# Patient Record
Sex: MALE | Race: WHITE | URBAN - NONMETROPOLITAN AREA
[De-identification: names, ages, dates, MRNs, and addresses within clinical notes are randomized per-mention and may not be internally consistent; named-entity substitution may affect disease eponyms.]

---

## 2018-07-13 ENCOUNTER — OFFICE VISIT (OUTPATIENT)
Dept: FAMILY MEDICINE | Facility: CLINIC | Age: 33
End: 2018-07-13
Payer: COMMERCIAL

## 2018-07-13 VITALS
BODY MASS INDEX: 20.41 KG/M2 | TEMPERATURE: 98.2 F | WEIGHT: 154 LBS | SYSTOLIC BLOOD PRESSURE: 124 MMHG | HEART RATE: 68 BPM | DIASTOLIC BLOOD PRESSURE: 82 MMHG | RESPIRATION RATE: 18 BRPM | HEIGHT: 73 IN

## 2018-07-13 DIAGNOSIS — S16.1XXA STRAIN OF NECK MUSCLE, INITIAL ENCOUNTER: Primary | ICD-10-CM

## 2018-07-13 PROCEDURE — 99203 OFFICE O/P NEW LOW 30 MIN: CPT | Performed by: NURSE PRACTITIONER

## 2018-07-13 RX ORDER — IBUPROFEN 800 MG/1
800 TABLET, FILM COATED ORAL EVERY 8 HOURS PRN
Qty: 60 TABLET | Refills: 0 | Status: SHIPPED | OUTPATIENT
Start: 2018-07-13 | End: 2018-08-14

## 2018-07-13 RX ORDER — METHOCARBAMOL 500 MG/1
500-1000 TABLET, FILM COATED ORAL 3 TIMES DAILY
Qty: 60 TABLET | Refills: 0 | Status: SHIPPED | OUTPATIENT
Start: 2018-07-13 | End: 2018-08-14

## 2018-07-13 RX ORDER — METHOCARBAMOL 750 MG/1
750 TABLET, FILM COATED ORAL 4 TIMES DAILY PRN
Qty: 30 TABLET | Refills: 0 | Status: SHIPPED | OUTPATIENT
Start: 2018-07-13 | End: 2018-07-13

## 2018-07-13 NOTE — NURSING NOTE
"Chief Complaint   Patient presents with     Neck Pain       Initial /82 (Cuff Size: Adult Large)  Pulse 68  Temp 98.2  F (36.8  C) (Tympanic)  Resp 18  Ht 6' 1\" (1.854 m)  Wt 154 lb (69.9 kg)  BMI 20.32 kg/m2 Estimated body mass index is 20.32 kg/(m^2) as calculated from the following:    Height as of this encounter: 6' 1\" (1.854 m).    Weight as of this encounter: 154 lb (69.9 kg).      Health Maintenance that is potentially due pending provider review:  NONE    n/a    Is there anyone who you would like to be able to receive your results? Not Applicable  If yes have patient fill out ZAIDA    "

## 2018-07-13 NOTE — LETTER
July 13, 2018      Bradley Cruz  [address] [facility] [address]        To Whom It May Concern:    Bradley Cruz  was seen today for neck muscle strain.  Please excuse him  until Monday July 16, 2018 due to injury.        Sincerely,        Love Llanos, CNP

## 2018-07-13 NOTE — MR AVS SNAPSHOT
After Visit Summary   7/13/2018    Bradley Cruz    MRN: [id number]           Patient Information     Date Of Birth          1985        Visit Information        Provider Department      7/13/2018 2:40 PM Love Llanos, CNP [facility]        Today's Diagnoses     Strain of neck muscle, initial encounter    -  1      Care Instructions    1. Strain of neck muscle, initial encounter  ACUTE  Robaxin muscle relaxer prescribed- take as directed. Do not drive or use heavy machinery with use        Understanding Cervical Strain    There are 7 bones (vertebrae) in the neck that are part of the spine. These are called the cervical spine. Cervical strain is a medical term for neck pain. The neck has several layers of muscles. These are connected with tendons to the cervical spine and other bones. Neck pain is often the result of injury to these muscles and tendons.  Causes of cervical strain  Different types of stress on the neck can damage muscles and tendons (soft tissues) and cause cervical strain. Cervical tissues can be damaged by:    The neck being forced past its normal range of motion, such as in a car accident or sports injury    Constant, low-level stress, such as from poor posture or a poorly set-up workspace  Symptoms of cervical strain  These may include:    Neck pain or stiffness    Pain in the shoulders or upper back    Muscle spasms    Headache, often starting at the base of the neck    Irritability, difficulty concentrating, or sleeplessness  Treatment for cervical strain  This problem often gets better on its own. Treatments aim to reduce pain and inflammation and increase the range of motion of the neck. Possible treatments include:    Over-the-counter or prescription pain medicine. These help relieve pain and inflammation.    Stretching exercises to decrease neck stiffness.    Massage to decrease neck stiffness.    Cold or heat pack. These help reduce pain and  swelling.  Call 911  Call 911 right away if you have any of these:    Face drooping or numbness    Numbness or weakness, especially in the arms or on one side    Slurred speech or difficulty speaking    Blurred vision   When to call your healthcare provider  Call your healthcare provider right away if you have any of these:    Fever of 100.4 F (38 C) or higher, or as directed    Pain or stiffness that gets worse    Symptoms that don t get better, or get worse    Numbness, tingling, weakness or shooting pains into the arms or legs    New symptoms  Date Last Reviewed: 3/10/2016    3961-9637 [employer]. [address] [place] [address]. All rights reserved. This information is not intended as a substitute for professional medical care. Always follow your healthcare professional's instructions.        Neck Clock (Flexibility)    1. Lie on your back on the floor, with your knees bent and your feet flat on the floor. Place a rolled-up towel or neck roll under your neck.  2. Close your eyes and imagine a clock face. With your nose, slowly trace the outer edge of the clock in a clockwise direction. Move your neck smoothly. Don t force your head or neck.  3. Repeat 5 times, or as instructed.  4. Then switch to a counterclockwise direction, and repeat the exercise 5 times, or as instructed.     Challenge yourself  You can also do this exercise while sitting at your work desk. Sit up straight with your back supported firmly against your chair. You can do the exercise several times throughout your day.   Date Last Reviewed: 3/10/2016    2949-8102 [employer]. [address] [place] [address]. All rights reserved. This information is not intended as a substitute for professional medical care. Always follow your healthcare professional's instructions.                Follow-ups after your visit        Who to contact     If you have questions or need follow up information about today's  "clinic visit or your schedule please contact [facility] directly at [phone].  Normal or non-critical lab and imaging results will be communicated to you by MyChart, letter or phone within 4 business days after the clinic has received the results. If you do not hear from us within 7 days, please contact the clinic through MyChart or phone. If you have a critical or abnormal lab result, we will notify you by phone as soon as possible.  Submit refill requests through [employer] or call your pharmacy and they will forward the refill request to us. Please allow 3 business days for your refill to be completed.          Additional Information About Your Visit        Care EveryWhere ID     This is your Care EveryWhere ID. This could be used by other organizations to access your [facility] medical records  [id number]        Your Vitals Were     Pulse Temperature Respirations Height BMI (Body Mass Index)       68 98.2  F (36.8  C) (Tympanic) 18 6' 1\" (1.854 m) 20.32 kg/m2        Blood Pressure from Last 3 Encounters:   07/13/18 124/82    Weight from Last 3 Encounters:   07/13/18 154 lb (69.9 kg)              Today, you had the following     No orders found for display         Today's Medication Changes          These changes are accurate as of 7/13/18  3:10 PM.  If you have any questions, ask your nurse or doctor.               Start taking these medicines.        Dose/Directions    ibuprofen 800 MG tablet   Commonly known as:  ADVIL/MOTRIN   Used for:  Strain of neck muscle, initial encounter   Started by:  Love Llanos CNP        Dose:  800 mg   Take 1 tablet (800 mg) by mouth every 8 hours as needed for moderate pain   Quantity:  60 tablet   Refills:  0       methocarbamol 750 MG tablet   Commonly known as:  ROBAXIN   Used for:  Strain of neck muscle, initial encounter   Started by:  Love Llanos CNP        Dose:  750 mg   Take 1 tablet (750 mg) by mouth 4 times daily as needed for " muscle spasms   Quantity:  30 tablet   Refills:  0            Where to get your medicines      These medications [id number]  [address] , [facility] [address]     Phone:  [phone]     ibuprofen 800 MG tablet    methocarbamol 750 MG tablet                Primary Care Provider Fax #    Physician No Ref-Primary [phone]       No address on file        Equal Access to Services     ENA POOLE : Hadii aad ku hadasho Soomaali, waaxda luqadaha, qaybta kaalmada adeegyada, waxay idiin haysupriyan adeeg khchasesh ladamiantimi ah. So [facility] [phone].    ATENCIÓN: Si habla deborah, tiene a martínez disposición servicios gratuitos de asistencia lingüística. Manjinderame al [phone].    We comply with applicable federal civil rights laws and [place] laws. We do not discriminate on the basis of race, color, national origin, age, disability, sex, sexual orientation, or gender identity.            Thank you!     Thank you for choosing [facility]  for your care. Our goal is always to provide you with excellent care. Hearing back from our patients is one way we can continue to improve our services. Please take a few minutes to complete the written survey that you may receive in the mail after your visit with us. Thank you!             Your Updated Medication List - Protect others around you: Learn how to safely use, store and throw away your medicines at www.disposemymeds.org.          This list is accurate as of 7/13/18  3:10 PM.  Always use your most recent med list.                   Brand Name Dispense Instructions for use Diagnosis    ibuprofen 800 MG tablet    ADVIL/MOTRIN    60 tablet    Take 1 tablet (800 mg) by mouth every 8 hours as needed for moderate pain    Strain of neck muscle, initial encounter       methocarbamol 750 MG tablet    ROBAXIN    30 tablet    Take 1 tablet (750 mg) by mouth 4 times daily as needed for muscle spasms    Strain of neck muscle,  initial encounter

## 2018-07-13 NOTE — PATIENT INSTRUCTIONS
1. Strain of neck muscle, initial encounter  ACUTE  Robaxin muscle relaxer prescribed- take as directed. Do not drive or use heavy machinery with use        Understanding Cervical Strain    There are 7 bones (vertebrae) in the neck that are part of the spine. These are called the cervical spine. Cervical strain is a medical term for neck pain. The neck has several layers of muscles. These are connected with tendons to the cervical spine and other bones. Neck pain is often the result of injury to these muscles and tendons.  Causes of cervical strain  Different types of stress on the neck can damage muscles and tendons (soft tissues) and cause cervical strain. Cervical tissues can be damaged by:    The neck being forced past its normal range of motion, such as in a car accident or sports injury    Constant, low-level stress, such as from poor posture or a poorly set-up workspace  Symptoms of cervical strain  These may include:    Neck pain or stiffness    Pain in the shoulders or upper back    Muscle spasms    Headache, often starting at the base of the neck    Irritability, difficulty concentrating, or sleeplessness  Treatment for cervical strain  This problem often gets better on its own. Treatments aim to reduce pain and inflammation and increase the range of motion of the neck. Possible treatments include:    Over-the-counter or prescription pain medicine. These help relieve pain and inflammation.    Stretching exercises to decrease neck stiffness.    Massage to decrease neck stiffness.    Cold or heat pack. These help reduce pain and swelling.  Call 911  Call 911 right away if you have any of these:    Face drooping or numbness    Numbness or weakness, especially in the arms or on one side    Slurred speech or difficulty speaking    Blurred vision   When to call your healthcare provider  Call your healthcare provider right away if you have any of these:    Fever of 100.4 F (38 C) or higher, or as directed    Pain  or stiffness that gets worse    Symptoms that don t get better, or get worse    Numbness, tingling, weakness or shooting pains into the arms or legs    New symptoms  Date Last Reviewed: 3/10/2016    [id number] [employer]. [address] [place] [address]. All rights reserved. This information is not intended as a substitute for professional medical care. Always follow your healthcare professional's instructions.        Neck Clock (Flexibility)    1. Lie on your back on the floor, with your knees bent and your feet flat on the floor. Place a rolled-up towel or neck roll under your neck.  2. Close your eyes and imagine a clock face. With your nose, slowly trace the outer edge of the clock in a clockwise direction. Move your neck smoothly. Don t force your head or neck.  3. Repeat 5 times, or as instructed.  4. Then switch to a counterclockwise direction, and repeat the exercise 5 times, or as instructed.     Challenge yourself  You can also do this exercise while sitting at your work desk. Sit up straight with your back supported firmly against your chair. You can do the exercise several times throughout your day.   Date Last Reviewed: 3/10/2016    [id number] [employer]. [address] [place] [address]. All rights reserved. This information is not intended as a substitute for professional medical care. Always follow your healthcare professional's instructions.

## 2018-07-13 NOTE — PROGRESS NOTES
"  SUBJECTIVE:   Bradley Cruz is a 33 year old male who presents to clinic today for the following health issues:      Neck Pain      Duration: Yesterday     Description:  Location: Neck - left side   Radiation: into the left neck and into the left Upper shoulder    Intensity:  moderate    Accompanying signs and symptoms: Can't turn his head to the left    History (similar episodes/previous evaluation): None    Precipitating or alleviating factors: None    Therapies tried and outcome: tylenol, heat, ice   Work-[employer] as [employer]  Woke up with neck pain  No injury  He's been off work for a week- no \"crazy activity\"    HPI:   PCP:  Physician No Ref-Primary None    There is no problem list on file for this patient.    Current Outpatient Prescriptions   Medication     ibuprofen (ADVIL/MOTRIN) 800 MG tablet     methocarbamol (ROBAXIN) 750 MG tablet     No current facility-administered medications for this visit.        Health Maintenance Due   Topic Date Due     PHQ-2 Q1 YR  03/14/1998     TETANUS IMMUNIZATION (SYSTEM ASSIGNED)  03/14/2004     HIV SCREEN (SYSTEM ASSIGNED)  03/14/2004       Reviewed and updated:  Tobacco  Allergies  Meds  Med Hx  Surg Hx  Fam Hx  Soc Hx     ROS:  Constitutional, HEENT, cardiovascular, pulmonary, gi and gu systems are negative, except as otherwise noted.    PHYSICAL EXAM:   /82 (Cuff Size: Adult Large)  Pulse 68  Temp 98.2  F (36.8  C) (Tympanic)  Resp 18  Ht 6' 1\" (1.854 m)  Wt 154 lb (69.9 kg)  BMI 20.32 kg/m2  Body mass index is 20.32 kg/(m^2).  GENERAL APPEARANCE: healthy, alert and no distress  NECK: no adenopathy, no asymmetry, masses, or scars and thyroid normal to palpation  RESP: lungs clear to auscultation - no rales, rhonchi or wheezes  CV: regular rates and rhythm, normal S1 S2, no S3 or S4 and no murmur, click or rub  MS: extremities normal- no gross deformities noted  ORTHO: Cervical Spine Exam: Inspection: normal cervical lordosis  Tender:  left " paracervical muscles  Non-tender:  remainder  Range of Motion:  flexion:  weakness, painful, extension: weakness, painful, left lateral bending: unable, painful, right lateral bending: decreased, painful, left lateral rotation:  decreased, painful, right lateral rotation:  decreased, painful  Strength: Full strength of all neck muscles, painful against left lateral bending  Special tests:  None d/t pain    PSYCH: mentation appears normal and affect normal/bright    ASSESSMENT & PLAN:     1. Strain of neck muscle, initial encounter  ACUTE  Robaxin muscle relaxer prescribed- take as directed. Do not drive or use heavy machinery with use  Ibuprofen prescribed- may use in harmony with Tylenol  Reviewed home care  Note for work- 2 days off  - ibuprofen (ADVIL/MOTRIN) 800 MG tablet; Take 1 tablet (800 mg) by mouth every 8 hours as needed for moderate pain  Dispense: 60 tablet; Refill: 0  - methocarbamol (ROBAXIN) 500 MG tablet; Take 1-2 tablets (500-1,000 mg) by mouth 3 times daily  Dispense: 60 tablet; Refill: 0      Understanding Cervical Strain    There are 7 bones (vertebrae) in the neck that are part of the spine. These are called the cervical spine. Cervical strain is a medical term for neck pain. The neck has several layers of muscles. These are connected with tendons to the cervical spine and other bones. Neck pain is often the result of injury to these muscles and tendons.  Causes of cervical strain  Different types of stress on the neck can damage muscles and tendons (soft tissues) and cause cervical strain. Cervical tissues can be damaged by:    The neck being forced past its normal range of motion, such as in a car accident or sports injury    Constant, low-level stress, such as from poor posture or a poorly set-up workspace  Symptoms of cervical strain  These may include:    Neck pain or stiffness    Pain in the shoulders or upper back    Muscle spasms    Headache, often starting at the base of the  neck    Irritability, difficulty concentrating, or sleeplessness  Treatment for cervical strain  This problem often gets better on its own. Treatments aim to reduce pain and inflammation and increase the range of motion of the neck. Possible treatments include:    Over-the-counter or prescription pain medicine. These help relieve pain and inflammation.    Stretching exercises to decrease neck stiffness.    Massage to decrease neck stiffness.    Cold or heat pack. These help reduce pain and swelling.  Call 911  Call 911 right away if you have any of these:    Face drooping or numbness    Numbness or weakness, especially in the arms or on one side    Slurred speech or difficulty speaking    Blurred vision   When to call your healthcare provider  Call your healthcare provider right away if you have any of these:    Fever of 100.4 F (38 C) or higher, or as directed    Pain or stiffness that gets worse    Symptoms that don t get better, or get worse    Numbness, tingling, weakness or shooting pains into the arms or legs    New symptoms  Date Last Reviewed: 3/10/2016    9795-1911 [employer]. [address] [place] [address]. All rights reserved. This information is not intended as a substitute for professional medical care. Always follow your healthcare professional's instructions.        Neck Clock (Flexibility)    1. Lie on your back on the floor, with your knees bent and your feet flat on the floor. Place a rolled-up towel or neck roll under your neck.  2. Close your eyes and imagine a clock face. With your nose, slowly trace the outer edge of the clock in a clockwise direction. Move your neck smoothly. Don t force your head or neck.  3. Repeat 5 times, or as instructed.  4. Then switch to a counterclockwise direction, and repeat the exercise 5 times, or as instructed.     Challenge yourself  You can also do this exercise while sitting at your work desk. Sit up straight with your back supported  firmly against your chair. You can do the exercise several times throughout your day.   Date Last Reviewed: 3/10/2016    3937-6029 [employer]. [address] [place] [address], [place] [address]. All rights reserved. This information is not intended as a substitute for professional medical care. Always follow your healthcare professional's instructions.          Risks, benefits, side effects and rationale for treatment plan fully discussed with the patient and understanding expressed.    FELIPA Vieira-BC  [facility]

## 2018-08-14 ENCOUNTER — OFFICE VISIT (OUTPATIENT)
Dept: FAMILY MEDICINE | Facility: CLINIC | Age: 33
End: 2018-08-14
Payer: COMMERCIAL

## 2018-08-14 VITALS
HEART RATE: 104 BPM | WEIGHT: 151 LBS | DIASTOLIC BLOOD PRESSURE: 82 MMHG | SYSTOLIC BLOOD PRESSURE: 128 MMHG | BODY MASS INDEX: 20.01 KG/M2 | RESPIRATION RATE: 20 BRPM | TEMPERATURE: 99.5 F | HEIGHT: 73 IN

## 2018-08-14 DIAGNOSIS — Z72.0 TOBACCO USE: ICD-10-CM

## 2018-08-14 DIAGNOSIS — F10.20 ALCOHOLISM (H): Primary | ICD-10-CM

## 2018-08-14 DIAGNOSIS — F33.1 MODERATE EPISODE OF RECURRENT MAJOR DEPRESSIVE DISORDER (H): ICD-10-CM

## 2018-08-14 PROCEDURE — 99214 OFFICE O/P EST MOD 30 MIN: CPT | Performed by: NURSE PRACTITIONER

## 2018-08-14 RX ORDER — BUPROPION HYDROCHLORIDE 150 MG/1
TABLET, EXTENDED RELEASE ORAL
Qty: 60 TABLET | Refills: 2 | Status: SHIPPED | OUTPATIENT
Start: 2018-08-14 | End: 2018-09-06

## 2018-08-14 ASSESSMENT — ANXIETY QUESTIONNAIRES
2. NOT BEING ABLE TO STOP OR CONTROL WORRYING: NEARLY EVERY DAY
1. FEELING NERVOUS, ANXIOUS, OR ON EDGE: NEARLY EVERY DAY
7. FEELING AFRAID AS IF SOMETHING AWFUL MIGHT HAPPEN: NEARLY EVERY DAY
5. BEING SO RESTLESS THAT IT IS HARD TO SIT STILL: NEARLY EVERY DAY
6. BECOMING EASILY ANNOYED OR IRRITABLE: SEVERAL DAYS
3. WORRYING TOO MUCH ABOUT DIFFERENT THINGS: NEARLY EVERY DAY
GAD7 TOTAL SCORE: 19

## 2018-08-14 ASSESSMENT — PATIENT HEALTH QUESTIONNAIRE - PHQ9: 5. POOR APPETITE OR OVEREATING: NEARLY EVERY DAY

## 2018-08-14 NOTE — PATIENT INSTRUCTIONS
"1. Alcoholism (H)  Chronic, stable  - buPROPion (WELLBUTRIN SR) 150 MG 12 hr tablet; Take 1 tablet once daily and increase to 1 tablet twice daily after 4 to 7 days  Dispense: 60 tablet; Refill: 2  - MENTAL HEALTH REFERRAL  - Adult; Psychiatry and Medication Management; Psychiatry; Other: [facility] [phone]; We will contact you to schedule the appointment or please call with any questions  Call to schedule with Psychiatrist who does medication managment  Call your insurance to find day treatment alcohol program  If you need a referral letter- call me and I'll make one for it    2. Moderate episode of recurrent major depressive disorder (H)  Chronic, uncontrolled  - buPROPion (WELLBUTRIN SR) 150 MG 12 hr tablet; Take 1 tablet once daily and increase to 1 tablet twice daily after 4 to 7 days  Dispense: 60 tablet; Refill: 2  - MENTAL HEALTH REFERRAL  - Adult; Psychiatry and Medication Management; Psychiatry; Other: [facility] [phone]; We will contact you to schedule the appointment or please call with any questions                Teen focus recovery - teens and adults [phone]  o   [email or web address]  o   [place]            [email or web address]    o   Behavioral health treatment   o   Treatment referral line  [phone]  * Also information line about sub abuse, mental health recovery  Mental Health Resources:   24hr Crisis Intervention   7-891-419-2450   [facility] (24 hour/7 days counselors) Text \"life\" to 82523   National [place] on Mental Illness            299.899.6477 or 901-513-9588.   [place] Association for Children's Mental Health         927.630.2438.   Alcoholics, Alanon, Narcotics Anonymous           [phone]  Suicide Awareness Voices of Education (SAVE)         8- 113-500-SAVE (0294)  National Suicide Prevention Line (www.mentalhealthmn.org)        958-440-TLIX (1449)  Mental Health Consumer/Survivor " Network of [place]         [phone] or 120-182-7057  M[place] Association of [place]            [phone] or 838-644-8735  Mental Health referral options (COUNSELING)    1. [facility]'s     [phone]  Direct number but is a part of [facility]/DEC - two locations - includes psychiatrist - if doing counseling and want psychiatrist at [facility] - must change counseling to [facility] as well  2. [facility]        [phone]  Helps pt find a doctor who uses their services - takes insurance into consideration and can schedule appointment    3. [facility] child and family services : Mobile Crisis Phone number [phone]   [facility]    4. [facility] crisis  [phone]  5. [facility]- Dr. Laura Whiting  [phone]   [place]   6. [facility] - counseling/coaching  [phone]  [place] based in [facility]  7. [facility]   [phone]  [place] -counseling, psychologist  8. [facility]    [phone]  [facility]  9. [facility] (pediatrics) [phone]  [facility]        Psychiatrist in the area (MEDICATION MANAGEMENT)  1. [facility]      [phone]  2. [facility] Mental Health  [phone]    [facility] - Rhianna Sams NP  (you need to be within the 5 [facility] and have a  from within those 5 [facility])  3. [facility]     [facility]      [phone]    [place]      [phone]  Extension# [id number]   [phone]    Shirley Benavides, CNS - Not a psychiatrist but can order medications and is a little quicker to get into at times - does not go through [facility]  Contact us for complete information on available services and eligibility information:   [facility] Health and Human Services Department   [address], [address], [place] [address]   [phone]   [address] [place] [address]   [phone]    Toll Free: [phone] web: [email or web address]      Understanding the Disease of Addiction  What is addiction?  Addiction is a long-lasting (chronic) disease of the brain. It affects how your brain learns and works.Your genes and your environment can affect your risk for addiction. A family history of addiction also raises your risk. But anyone can have an addiction.Unfortunately, many people falsely think that addiction is a moral weakness. They think that people addicted to drugs or alcohol are just behaving badly or making poor choices.  How does addiction affect my brain?  Whether you start using drugs or alcohol is your choice. But once your brain is exposed to the addictive substance, your brain begins to change. This is especially true if you are more at risk for addiction. These brain changes overpower your self-control. This happens because the substance overexcites the brain s reward center. The substance mimics the brain's own natural feel-good chemicals. The brain is rewired into believing that the substance is a good thing and that you need it to survive. This rewiring is very strong. Over time, you no longer find pleasure in other things you once enjoyed. The addiction is more powerful.  If you keep using the substance, your brain makes less of its own feel-good chemicals. You then must keep using drugs or alcohol to try to make up for the low levels of the brain chemicals. Over time your brain needs more and more of the drug or alcohol to achieve this. You need the drug. You no longer think about the physical, emotional, and social harm it causes.  Can you become addicted to things other than drugs or alcohol?  Addiction can happen in response to other pleasurable things that stimulate the brain s reward center. These things include eating, having sex, gambling, using tobacco, and using the internet.  Can you get control over a brain disease?  The only way to get over an  addiction is to stop using the substance. Not using it lets your brain recover and go back to its normal functioning. You can relearn how to find pleasure in other things again. But your brain will always be at risk for addiction. Addiction is very powerful. So you usually will need medical help and social support for long-term success.  Addiction is a chronic condition. It s common for people who are recovering from addiction to start using the substance again (called a relapse). This doesn t mean that treatment doesn t work. Just like other chronic health conditions, addiction requires ongoing treatment that changes as the person s needs change.    Date Last Reviewed: 5/1/2017 2000-2017 [employer]. [address] [place] [address], [place] [address]. All rights reserved. This information is not intended as a substitute for professional medical care. Always follow your healthcare professional's instructions.

## 2018-08-14 NOTE — PROGRESS NOTES
SUBJECTIVE:   Bradley Cruz is a 33 year old male who presents to clinic today for the following health issues:      Abnormal Mood Symptoms  Onset: Age 16     Description:   Depression: YES  Anxiety: YES    Accompanying Signs & Symptoms:  Still participating in activities that you used to enjoy: YES  Fatigue: YES  Irritability: YES  Difficulty concentrating: YES  Changes in appetite: YES  Problems with sleep: YES  Heart racing/beating fast : YES  Thoughts of hurting yourself or others: none    History:   Recent stress: YES- work   Prior depression hospitalization: None  Family history of depression: YES  Family history of anxiety: no    Precipitating factors:   Alcohol/drug use: YES- since age 16 none since 07/25/2018     Alleviating factors:  None  Therapies Tried and outcome: None    He was seen in ER at [facility] on 7/21/18 and seen by Patricia Espinal. He is trying to have her complete his paperwork for work. He is finding it difficult getting information from [facility].     He smokes- rolls his own. Since he quit drinking, he has been smoking more. He lives with his dog. He notes he hates his job. Future goal: start a [employer] company. He is not suicidal.    PHQ-9 SCORE 8/14/2018   Total Score 16     DERRICK-7 SCORE 8/14/2018   Total Score 19       Smoking  Rolls his own  2 ppd      HPI:   PCP:  Physician No Ref-Primary None    There is no problem list on file for this patient.    No current outpatient prescriptions on file.     No current facility-administered medications for this visit.        Health Maintenance Due   Topic Date Due     PHQ-2 Q1 YR  03/14/1998     TETANUS IMMUNIZATION (SYSTEM ASSIGNED)  03/14/2004     HIV SCREEN (SYSTEM ASSIGNED)  03/14/2004       Reviewed and updated:  Tobacco  Allergies  Meds  Med Hx  Surg Hx  Fam Hx  Soc Hx     ROS:  Constitutional, HEENT, cardiovascular, pulmonary, gi and gu systems are negative, except as otherwise noted.    PHYSICAL EXAM:   /82 (BP Location:  "Right arm, Patient Position: Sitting, Cuff Size: Adult Regular)  Pulse 104  Temp 99.5  F (37.5  C) (Tympanic)  Resp 20  Ht 6' 1\" (1.854 m)  Wt 151 lb (68.5 kg)  BMI 19.92 kg/m2  Body mass index is 19.92 kg/(m^2).  GENERAL APPEARANCE: healthy, alert and no distress  RESP: lungs clear to auscultation - no rales, rhonchi or wheezes  CV: regular rates and rhythm, normal S1 S2, no S3 or S4 and no murmur, click or rub  MS: extremities normal- no gross deformities noted  PSYCH: mentation appears normal and affect normal/bright, he is \"squirmy\" during our discussion. NO smell of alcohol on his breath.     ASSESSMENT & PLAN:     1. Alcoholism (H)  Chronic, stable  - Start buPROPion (WELLBUTRIN SR) 150 MG 12 hr tablet; Take 1 tablet once daily and increase to 1 tablet twice daily after 4 to 7 days  Dispense: 60 tablet; Refill: 2  - MENTAL HEALTH REFERRAL  - Adult; Psychiatry and Medication Management; Psychiatry; Other: [facility] [phone]; We will contact you to schedule the appointment or please call with any questions    Call to schedule with Psychiatrist who does medication management- [facility]    Call your insurance to find day treatment alcohol program- If you need a referral letter- call me and I'll make one for you    Speak with ER provider who de-toxed you so she can sign of on your short term disability paperwork    Recheck with me in 30 days either in person or by phone    2. Moderate episode of recurrent major depressive disorder (H)  Chronic, uncontrolled  - buPROPion (WELLBUTRIN SR) 150 MG 12 hr tablet; Take 1 tablet once daily and increase to 1 tablet twice daily after 4 to 7 days  Dispense: 60 tablet; Refill: 2  - MENTAL HEALTH REFERRAL  - Adult; Psychiatry and Medication Management; Psychiatry; Other: [facility] [phone]; We will contact you to schedule the appointment or please call with any questions                Teen focus recovery - teens and adults " "[phone]  o   [email or web address]  o   [place]            [email or web address]    o   Behavioral health treatment   o   Treatment referral line  1-589-963-7690  * Also information line about sub abuse, mental health recovery  Mental Health Resources:   24hr Crisis Intervention   2-842-970-0941   [facility] (24 hour/7 days counselors) Text \"life\" to 37694   National [place] on Mental Illness            423.154.2834 or 416-969-7493.   [place] Association for Children's Mental Health         725.241.8073.   Alcoholics, Alanon, Narcotics Anonymous           [phone]  Suicide Awareness Voices of Education (SAVE)         3- 100-474-SAVE (0253)  National Suicide Prevention Line (www.mentalhealthmn.org)        368-300-TEVR (6153)  Mental Health Consumer/Survivor Network of [place]         736.222.5672 or 913-926-2534  M[place] Association of [place]            258.224.8059 or 457-489-8511  Mental Health referral options (COUNSELING)    1. [facility]     [phone]  Direct number but is a part of [facility] - two locations - includes psychiatrist - if doing counseling and want psychiatrist at [facility] - must change counseling to [facility] as well  2. [facility]        [phone]  Helps pt find a doctor who uses their services - takes insurance into consideration and can schedule appointment    3. [facility] child and family services : Mobile Crisis Phone number [phone]   [facility]    4. [facility] crisis  [phone]  5. [facility]- Dr. Laura Whiting  [phone]   [place]   6. [facility] - counseling/coaching  [phone]  [place] based in [facility]  7. [facility]   [phone]  [place] -counseling, psychologist  8. [facility]    [phone]  [facility]  9. [facility] (pediatrics) [phone]  [facility]        Psychiatrist in the area " (MEDICATION MANAGEMENT)  1. [facility]      [phone]  2. [facility] Mental Health  [phone]    [facility] - Rhianna Sams NP  (you need to be within the 5 [facility] and have a  from within those 5 [facility])  3. [facility]     [place]      [phone]    [place]      [phone]  Extension# [id number] Human Services   [phone]    EDITH Polo - Not a psychiatrist but can order medications and is a little quicker to get into at times - does not go through [facility] Resources  Contact us for complete information on available services and eligibility information:   [facility] and Human Services Department   [address], [facility] [address], [place] [address]   [phone]   [address] [facility] [address] [place] [address]   [phone]   Toll Free: [phone] web: [email or web address] and human services      Understanding the Disease of Addiction  What is addiction?  Addiction is a long-lasting (chronic) disease of the brain. It affects how your brain learns and works.Your genes and your environment can affect your risk for addiction. A family history of addiction also raises your risk. But anyone can have an addiction.Unfortunately, many people falsely think that addiction is a moral weakness. They think that people addicted to drugs or alcohol are just behaving badly or making poor choices.  How does addiction affect my brain?  Whether you start using drugs or alcohol is your choice. But once your brain is exposed to the addictive substance, your brain begins to change. This is especially true if you are more at risk for addiction. These brain changes overpower your self-control. This happens because the substance overexcites the brain s reward center. The substance mimics the brain's own natural feel-good chemicals. The brain is rewired into believing that the substance is a good thing and that you need it to survive. This rewiring is  very strong. Over time, you no longer find pleasure in other things you once enjoyed. The addiction is more powerful.  If you keep using the substance, your brain makes less of its own feel-good chemicals. You then must keep using drugs or alcohol to try to make up for the low levels of the brain chemicals. Over time your brain needs more and more of the drug or alcohol to achieve this. You need the drug. You no longer think about the physical, emotional, and social harm it causes.  Can you become addicted to things other than drugs or alcohol?  Addiction can happen in response to other pleasurable things that stimulate the brain s reward center. These things include eating, having sex, gambling, using tobacco, and using the internet.  Can you get control over a brain disease?  The only way to get over an addiction is to stop using the substance. Not using it lets your brain recover and go back to its normal functioning. You can relearn how to find pleasure in other things again. But your brain will always be at risk for addiction. Addiction is very powerful. So you usually will need medical help and social support for long-term success.  Addiction is a chronic condition. It s common for people who are recovering from addiction to start using the substance again (called a relapse). This doesn t mean that treatment doesn t work. Just like other chronic health conditions, addiction requires ongoing treatment that changes as the person s needs change.    Date Last Reviewed: 5/1/2017 2000-2017 [employer]. [address] [place] [address], [place] [address]. All rights reserved. This information is not intended as a substitute for professional medical care. Always follow your healthcare professional's instructions.              Risks, benefits, side effects and rationale for treatment plan fully discussed with the patient and understanding expressed.    Love Llanos, [facility]

## 2018-08-14 NOTE — MR AVS SNAPSHOT
After Visit Summary   8/14/2018    Bradley Cruz    MRN: [id number]           Patient Information     Date Of Birth          1985        Visit Information        Provider Department      8/14/2018 4:00 PM Love Llanos, [facility]        Today's Diagnoses     Alcoholism (H)    -  1    Moderate episode of recurrent major depressive disorder (H)          Care Instructions    1. Alcoholism (H)  Chronic, stable  - buPROPion (WELLBUTRIN SR) 150 MG 12 hr tablet; Take 1 tablet once daily and increase to 1 tablet twice daily after 4 to 7 days  Dispense: 60 tablet; Refill: 2  - MENTAL HEALTH REFERRAL  - Adult; Psychiatry and Medication Management; Psychiatry; Other: [facility] [phone]; We will contact you to schedule the appointment or please call with any questions  Call to schedule with Psychiatrist who does medication managment  Call your insurance to find day treatment alcohol program  If you need a referral letter- call me and I'll make one for it    2. Moderate episode of recurrent major depressive disorder (H)  Chronic, uncontrolled  - buPROPion (WELLBUTRIN SR) 150 MG 12 hr tablet; Take 1 tablet once daily and increase to 1 tablet twice daily after 4 to 7 days  Dispense: 60 tablet; Refill: 2  - MENTAL HEALTH REFERRAL  - Adult; Psychiatry and Medication Management; Psychiatry; Other: [facility] [phone]; We will contact you to schedule the appointment or please call with any questions                [facility] focus recovery - teens and adults [phone]  o   www.[facility]llc.com  o   [place]            www.[email or web address].gov    o   Behavioral health treatment   o   Treatment referral line  1-750.188.9961  * Also information line about sub abuse, mental health recovery  Mental Health Resources:   24hr Crisis Intervention   1-634.955.2049   [facility] (24 hour/7 days  "counselors) Text \"life\" to 58482   National [place] on Mental Illness            964.173.1690 or 831-974-6245.   [place] Association for Children's Mental Health         129.854.8299.   Alcoholics, [place], Narcotics Anonymous           [phone]  Suicide Awareness Voices of Education (SAVE)         9- 306-719-SAVE (6447)  National Suicide Prevention Line (www.mentalhealthmn.org)        098-771-DFBD (8017)  Mental Health Consumer/Survivor Network of [place]         106.633.5320 or 203-996-1986  M[place] Association of [place]            [phone] or 919-393-1054  Mental Health referral options (COUNSELING)    1. [facility]     [phone]  Direct number but is a part of [facility]/DEC - two locations - includes psychiatrist - if doing counseling and want psychiatrist at [facility] - must change counseling to [facility] as well  2. [facility]        [phone]  Helps pt find a doctor who uses their services - takes insurance into consideration and can schedule appointment    3. [facility] family [facility] : Mobile Crisis Phone number [phone]   [facility]    4. [facility] crisis  [phone]  5. [facility]- Dr. Laura Whiting  [phone]   [place]   6. [facility] - counseling/coaching  [phone]  [place] based in [facility]  7. [facility]   [phone]  [facility] -counseling, psychologist  8. [facility]    [phone]  [facility]  9. [facility] (pediatrics) [phone]  [facility]        Psychiatrist in the area (MEDICATION MANAGEMENT)  1. [facility]      [phone]  2. [facility]  [phone]    [facility] - Rhianna Sams NP  (you need to be within the [facility] and have a  from within those 5 [facility])  3. [facility]     [place]      [phone]    [place]      [phone]  [facility]   [phone]    Shirley Benavides, CNS - Not a psychiatrist but can order medications and is a little quicker to get into at times - does not go through [facility] Resources  Contact us for complete information on available services and eligibility information:   [facility] and Human Services Department   [address], [place] [address]   [phone]   [address] [place] [address]   [phone]   Toll Free: [phone] web: [email or web address] and human services      Understanding the Disease of Addiction  What is addiction?  Addiction is a long-lasting (chronic) disease of the brain. It affects how your brain learns and works.Your genes and your environment can affect your risk for addiction. A family history of addiction also raises your risk. But anyone can have an addiction.Unfortunately, many people falsely think that addiction is a moral weakness. They think that people addicted to drugs or alcohol are just behaving badly or making poor choices.  How does addiction affect my brain?  Whether you start using drugs or alcohol is your choice. But once your brain is exposed to the addictive substance, your brain begins to change. This is especially true if you are more at risk for addiction. These brain changes overpower your self-control. This happens because the substance overexcites the brain s reward center. The substance mimics the brain's own natural feel-good chemicals. The brain is rewired into believing that the substance is a good thing and that you need it to survive. This rewiring is very strong. Over time, you no longer find pleasure in other things you once enjoyed. The addiction is more powerful.  If you keep using the substance, your brain makes less of its own feel-good chemicals. You then must keep using drugs or alcohol to try to make up for the low levels of the brain chemicals. Over time your brain needs more and more of the drug or alcohol to  achieve this. You need the drug. You no longer think about the physical, emotional, and social harm it causes.  Can you become addicted to things other than drugs or alcohol?  Addiction can happen in response to other pleasurable things that stimulate the brain s reward center. These things include eating, having sex, gambling, using tobacco, and using the internet.  Can you get control over a brain disease?  The only way to get over an addiction is to stop using the substance. Not using it lets your brain recover and go back to its normal functioning. You can relearn how to find pleasure in other things again. But your brain will always be at risk for addiction. Addiction is very powerful. So you usually will need medical help and social support for long-term success.  Addiction is a chronic condition. It s common for people who are recovering from addiction to start using the substance again (called a relapse). This doesn t mean that treatment doesn t work. Just like other chronic health conditions, addiction requires ongoing treatment that changes as the person s needs change.    Date Last Reviewed: 5/1/2017 2000-2017 The [employer]. [address] [place] [address]. All rights reserved. This information is not intended as a substitute for professional medical care. Always follow your healthcare professional's instructions.                Follow-ups after your visit        Additional Services     MENTAL HEALTH REFERRAL  - Adult; Psychiatry and Medication Management; Psychiatry; Other: [facility] [phone]; We will contact you to schedule the appointment or please call with any questions       All scheduling is subject to the client's specific insurance plan & benefits, provider/location availability, and provider clinical specialities.  Please arrive 15 minutes early for your first appointment and bring your completed paperwork.    Alcoholism- looking for day  "treatment  Moderate depression  Please be aware that coverage of these services is subject to the terms and limitations of your health insurance plan.  Call member services at your health plan with any benefit or coverage questions.                  Who to contact     If you have questions or need follow up information about today's clinic visit or your schedule please contact [facility] directly at [phone].  Normal or non-critical lab and imaging results will be communicated to you by MyChart, letter or phone within 4 business days after the clinic has received the results. If you do not hear from us within 7 days, please contact the clinic through MyChart or phone. If you have a critical or abnormal lab result, we will notify you by phone as soon as possible.  Submit refill requests through [employer] or call your pharmacy and they will forward the refill request to us. Please allow 3 business days for your refill to be completed.          Additional Information About Your Visit        Care EveryWhere ID     This is your Care EveryWhere ID. This could be used by other organizations to access your [place] medical records  [id number]        Your Vitals Were     Pulse Temperature Respirations Height BMI (Body Mass Index)       104 99.5  F (37.5  C) (Tympanic) 20 6' 1\" (1.854 m) 19.92 kg/m2        Blood Pressure from Last 3 Encounters:   08/14/18 128/82   07/13/18 124/82    Weight from Last 3 Encounters:   08/14/18 151 lb (68.5 kg)   07/13/18 154 lb (69.9 kg)              We Performed the Following     MENTAL HEALTH REFERRAL  - Adult; Psychiatry and Medication Management; Psychiatry; Other: [facility] [phone]; We will contact you to schedule the appointment or please call with any questions          Today's Medication Changes          These changes are accurate as of 8/14/18  4:30 PM.  If you have any questions, ask your nurse or doctor.               Start taking these " medicines.        Dose/Directions    buPROPion 150 MG 12 hr tablet   Commonly known as:  WELLBUTRIN SR   Used for:  Alcoholism (H), Moderate episode of recurrent major depressive disorder (H)   Started by:  Love Llanos CNP        Take 1 tablet once daily and increase to 1 tablet twice daily after 4 to 7 days   Quantity:  60 tablet   Refills:  2            Where to get your medicines      [facility] [address] [facility] [facility]  [address] [facility] [address]     Phone:  [phone]     buPROPion 150 MG 12 hr tablet                Primary Care Provider Fax #    Physician No Ref-Primary [phone]       No address on file        Equal Access to Services     ENA POOLE : Vanessa Swift, carlyn cantu, brissa garcia, karon faye . So [facility] [phone].    ATENCIÓN: Si habla español, tiene a martínez disposición servicios gratuitos de asistencia lingüística. Llame al [phone].    We comply with applicable federal civil rights laws and [place] laws. We do not discriminate on the basis of race, color, national origin, age, disability, sex, sexual orientation, or gender identity.            Thank you!     Thank you for choosing [facility]  for your care. Our goal is always to provide you with excellent care. Hearing back from our patients is one way we can continue to improve our services. Please take a few minutes to complete the written survey that you may receive in the mail after your visit with us. Thank you!             Your Updated Medication List - Protect others around you: Learn how to safely use, store and throw away your medicines at www.disposemymeds.org.          This list is accurate as of 8/14/18  4:30 PM.  Always use your most recent med list.                   Brand Name Dispense Instructions for use Diagnosis    buPROPion 150 MG 12 hr tablet    WELLBUTRIN SR     60 tablet    Take 1 tablet once daily and increase to 1 tablet twice daily after 4 to 7 days    Alcoholism (H), Moderate episode of recurrent major depressive disorder (H)

## 2018-08-14 NOTE — NURSING NOTE
"Chief Complaint   Patient presents with     Depression     Alcohol Problem       Initial /82 (BP Location: Right arm, Patient Position: Sitting, Cuff Size: Adult Regular)  Pulse 104  Temp 99.5  F (37.5  C) (Tympanic)  Resp 20  Ht 6' 1\" (1.854 m)  Wt 151 lb (68.5 kg)  BMI 19.92 kg/m2 Estimated body mass index is 19.92 kg/(m^2) as calculated from the following:    Height as of this encounter: 6' 1\" (1.854 m).    Weight as of this encounter: 151 lb (68.5 kg).      Health Maintenance that is potentially due pending provider review:  NONE    n/a    Is there anyone who you would like to be able to receive your results? No  If yes have patient fill out ZAIDA    "

## 2018-08-15 ASSESSMENT — ANXIETY QUESTIONNAIRES: GAD7 TOTAL SCORE: 19

## 2018-08-15 ASSESSMENT — PATIENT HEALTH QUESTIONNAIRE - PHQ9: SUM OF ALL RESPONSES TO PHQ QUESTIONS 1-9: 16

## 2018-09-06 DIAGNOSIS — F33.1 MODERATE EPISODE OF RECURRENT MAJOR DEPRESSIVE DISORDER (H): ICD-10-CM

## 2018-09-06 DIAGNOSIS — F10.20 ALCOHOLISM (H): ICD-10-CM

## 2018-09-06 RX ORDER — BUPROPION HYDROCHLORIDE 150 MG/1
150 TABLET, EXTENDED RELEASE ORAL 2 TIMES DAILY
Qty: 60 TABLET | Refills: 0 | Status: SHIPPED | OUTPATIENT
Start: 2018-09-06

## 2018-09-06 NOTE — TELEPHONE ENCOUNTER
"Requested Prescriptions   Pending Prescriptions Disp Refills     buPROPion (WELLBUTRIN SR) 150 MG 12 hr tablet 60 tablet 2     Sig: Take 1 tablet once daily and increase to 1 tablet twice daily after 4 to 7 days    SSRIs Protocol Failed    9/6/2018 11:08 AM       Failed - PHQ-9 score less than 5 in past 6 months    Please review last PHQ-9 score.     PHQ-9 SCORE 8/14/2018   Total Score 16     DERRICK-7 SCORE 8/14/2018   Total Score 19                Failed - Recent (6 mo) or future (30 days) visit within the authorizing provider's specialty    Patient had office visit in the last 6 months or has a visit in the next 30 days with authorizing provider or within the authorizing provider's specialty.  See \"Patient Info\" tab in inbasket, or \"Choose Columns\" in Meds & Orders section of the refill encounter.           Passed - Medication is Bupropion    If the medication is Bupropion (Wellbutrin), and the patient is taking for smoking cessation; OK to refill.         Passed - Patient is age 18 or older          Look like this is a dupilcation from 8/14/16 for buPROPion (WELLBUTRIN SR) 150 MG 12 hr tablet at [facility]  "

## 2018-09-24 ENCOUNTER — OFFICE VISIT (OUTPATIENT)
Dept: FAMILY MEDICINE | Facility: CLINIC | Age: 33
End: 2018-09-24
Payer: COMMERCIAL

## 2018-09-24 VITALS
SYSTOLIC BLOOD PRESSURE: 146 MMHG | RESPIRATION RATE: 16 BRPM | WEIGHT: 152 LBS | TEMPERATURE: 97.6 F | BODY MASS INDEX: 20.05 KG/M2 | HEART RATE: 104 BPM | DIASTOLIC BLOOD PRESSURE: 76 MMHG

## 2018-09-24 DIAGNOSIS — R45.89 DEPRESSED MOOD: ICD-10-CM

## 2018-09-24 DIAGNOSIS — F19.10 POLYSUBSTANCE ABUSE (H): Primary | ICD-10-CM

## 2018-09-24 DIAGNOSIS — F41.9 ANXIETY: ICD-10-CM

## 2018-09-24 DIAGNOSIS — R03.0 ELEVATED BLOOD PRESSURE READING WITHOUT DIAGNOSIS OF HYPERTENSION: ICD-10-CM

## 2018-09-24 PROCEDURE — 99214 OFFICE O/P EST MOD 30 MIN: CPT | Performed by: NURSE PRACTITIONER

## 2018-09-24 ASSESSMENT — ANXIETY QUESTIONNAIRES
7. FEELING AFRAID AS IF SOMETHING AWFUL MIGHT HAPPEN: MORE THAN HALF THE DAYS
1. FEELING NERVOUS, ANXIOUS, OR ON EDGE: NEARLY EVERY DAY
6. BECOMING EASILY ANNOYED OR IRRITABLE: MORE THAN HALF THE DAYS
3. WORRYING TOO MUCH ABOUT DIFFERENT THINGS: NEARLY EVERY DAY
GAD7 TOTAL SCORE: 19
2. NOT BEING ABLE TO STOP OR CONTROL WORRYING: NEARLY EVERY DAY
5. BEING SO RESTLESS THAT IT IS HARD TO SIT STILL: NEARLY EVERY DAY

## 2018-09-24 ASSESSMENT — PATIENT HEALTH QUESTIONNAIRE - PHQ9: 5. POOR APPETITE OR OVEREATING: NEARLY EVERY DAY

## 2018-09-24 NOTE — LETTER
[facility]  [address] [facility] [address]  Phone: [phone]  Fax: [phone]    2018        Bradley ELIANE Anthony  [address] [facility] [place] [address]          To whom it may concern:    RE: Bradley G Anthony    Patient may return to work 18 with the followin hour shifts 3 days a week. May need to be allowed time of for medical appointments.     Please contact me for questions or concerns.      Sincerely,        Katia Vega NP

## 2018-09-24 NOTE — PROGRESS NOTES
"  SUBJECTIVE:   Bradley Cruz is a 33 year old male who presents to clinic today for the following health issues:    Depression Followup    Status since last visit: Worsened. Patient states it seems like he goes from low lows to high highs. Patient states he has no energy and he has been smoking a lot.    See PHQ-9 for current symptoms.  Other associated symptoms: None    Complicating factors:   Significant life event:  No   Current substance abuse:  None- been having caffeine.   Anxiety or Panic symptoms:  Yes-  both    PHQ-9 8/14/2018 9/24/2018   Total Score 16 19   Q9: Suicide Ideation Not at all Not at all     DERRICK-7 SCORE 8/14/2018 9/24/2018   Total Score 19 19         PHQ-9  English  PHQ-9   Any Language  Suicide Assessment Five-step Evaluation and Treatment (SAFE-T)    Amount of exercise or physical activity: None- nothing for the last month.    Problems taking medications regularly: No    Medication side effects: none/ Well not sure.    Diet: regular (no restrictions)        Was started on wellbutrin   Thought that this would help him quit smoking- he is smoking twice as much    No thoughts of suicide       Used cocaine excessively until 6 months ago weaned self down and lest use was 2 months   Alcohol use at bedtime- last drink was 2 month   Was using LSD during day- last was 2 months     Never went to treatment did this on his own   Support system- mom \"rock\", Dad is an addict (alcoholism, marijuana, pain pills)   Uncle killed himself by jumping off a bridge     Took a FMLA- to try and get mental health sorted out   Was working at [employer]- tired to go to work today but told needed a note to return   Really wants to return to work   Would consider reduced hours       Problem list and histories reviewed & adjusted, as indicated.  Additional history: as documented    Patient Active Problem List   Diagnosis     Moderate episode of recurrent major depressive disorder (H)     Alcoholism (H)     Tobacco " use     History reviewed. No pertinent surgical history.    Social History   Substance Use Topics     Smoking status: Current Every Day Smoker     Smokeless tobacco: Never Used     Alcohol use No      Comment: no alchohol since 07/25/2018     Family History   Problem Relation Age of Onset     Thyroid Disease Mother      Substance Abuse Father            Reviewed and updated as needed this visit by clinical staff  Tobacco  Allergies  Meds  Med Hx  Surg Hx  Fam Hx  Soc Hx      Reviewed and updated as needed this visit by Provider         ROS:  Constitutional, HEENT, cardiovascular, pulmonary, gi and gu systems are negative, except as otherwise noted.    OBJECTIVE:                                                    /76  Pulse 104  Temp 97.6  F (36.4  C) (Tympanic)  Resp 16  Wt 152 lb (68.9 kg)  BMI 20.05 kg/m2  Body mass index is 20.05 kg/(m^2).  GENERAL APPEARANCE: healthy, alert and no distress  RESP: lungs clear to auscultation - no rales, rhonchi or wheezes  CV: regular rates and rhythm, normal S1 S2, no S3 or S4 and no murmur, click or rub  PSYCH: anxious and concentration poor    Diagnostic test results:  Diagnostic Test Results:  none      ASSESSMENT/PLAN:                                                      1. Polysubstance abuse    2. Depressed mood    3. Anxiety    4. Elevated blood pressure reading without diagnosis of hypertension        With significant family history of addiction  Personal history of polysubstance abuse   Current depressed state   Question possibility of bi polar diagnosis     I recommend that this be managed by psychiatry   Referral placed   Letter written to return to work at reduced hours   Blood pressure was also noted to be elevated if still elevated in 3 weeks may need to consider starting medication to reduce     Patient Instructions   I recommend you see a mental health specialist who can help with medication management   Concern is with addictive personality,  poly substance abuse- possible bipolar diagnosis     Stay on Wellbutrin for now   You will be called to set up the appointment with specialist     Ok to go go back to work with shorter shifts    Let me know if you need FMLA paperwork filled out     See me back in 3 weeks for recheck   Call and let me know if any issues with getting in to see specialist         Katia Vega NP  [facility]

## 2018-09-24 NOTE — MR AVS SNAPSHOT
After Visit Summary   9/24/2018    Bradley Cruz    MRN: [id number]           Patient Information     Date Of Birth          1985        Visit Information        Provider Department      9/24/2018 2:00 PM Katia Vega NP [facility]        Today's Diagnoses     Polysubstance abuse    -  1    Depressed mood        Anxiety          Care Instructions    I recommend you see a mental health specialist who can help with medication management   Concern is with addictive personality, poly substance abuse- possible bipolar diagnosis     Stay on Wellbutrin for now   You will be called to set up the appointment with specialist     Ok to go go back to work with shorter shifts    Let me know if you need FMLA paperwork filled out     See me back in 3 weeks for recheck   Call and let me know if any issues with getting in to see specialist             Follow-ups after your visit        Additional Services     MENTAL HEALTH REFERRAL  - Adult; Psychiatry and Medication Management; Psychiatry; Other: [facility] [phone]; We will contact you to schedule the appointment or please call with any questions       All scheduling is subject to the client's specific insurance plan & benefits, provider/location availability, and provider clinical specialities.  Please arrive 15 minutes early for your first appointment and bring your completed paperwork.    Please be aware that coverage of these services is subject to the terms and limitations of your health insurance plan.  Call member services at your health plan with any benefit or coverage questions.                            Follow-up notes from your care team     Return in about 3 weeks (around 10/15/2018) for Routine Visit.      Who to contact     If you have questions or need follow up information about today's clinic visit or your schedule please contact [facility] directly at [phone].  Normal or  non-critical lab and imaging results will be communicated to you by MyChart, letter or phone within 4 business days after the clinic has received the results. If you do not hear from us within 7 days, please contact the clinic through MyChart or phone. If you have a critical or abnormal lab result, we will notify you by phone as soon as possible.  Submit refill requests through [employer] or call your pharmacy and they will forward the refill request to us. Please allow 3 business days for your refill to be completed.          Additional Information About Your Visit        Care EveryWhere ID     This is your Care EveryWhere ID. This could be used by other organizations to access your [place] medical records  [id number]        Your Vitals Were     Pulse Temperature Respirations BMI (Body Mass Index)          104 97.6  F (36.4  C) (Tympanic) 16 20.05 kg/m2         Blood Pressure from Last 3 Encounters:   09/24/18 146/76   08/14/18 128/82   07/13/18 124/82    Weight from Last 3 Encounters:   09/24/18 152 lb (68.9 kg)   08/14/18 151 lb (68.5 kg)   07/13/18 154 lb (69.9 kg)              We Performed the Following     MENTAL HEALTH REFERRAL  - Adult; Psychiatry and Medication Management; Psychiatry; Other: [facility] [phone]; We will contact you to schedule the appointment or please call with any questions        Primary Care Provider Fax #    Physician No Ref-Primary [phone]       No address on file        Equal Access to Services     ENA POOLE : Hadii christofer soliso Sofrantzali, waaxda luqadaha, qaybta kaalmada adeegyada, karon faye . So [facility] [phone].    ATENCIÓN: Si habla español, tiene a martínez disposición servicios gratuitos de asistencia lingüística. Llame al [phone].    We comply with applicable federal civil rights laws and [place] laws. We do not discriminate on the basis of race, color, national origin, age, disability, sex, sexual  orientation, or gender identity.            Thank you!     Thank you for choosing [facility]  for your care. Our goal is always to provide you with excellent care. Hearing back from our patients is one way we can continue to improve our services. Please take a few minutes to complete the written survey that you may receive in the mail after your visit with us. Thank you!             Your Updated Medication List - Protect others around you: Learn how to safely use, store and throw away your medicines at www.disposemymeds.org.          This list is accurate as of 9/24/18  2:48 PM.  Always use your most recent med list.                   Brand Name Dispense Instructions for use Diagnosis    buPROPion 150 MG 12 hr tablet    WELLBUTRIN SR    60 tablet    Take 1 tablet (150 mg) by mouth 2 times daily    Alcoholism (H), Moderate episode of recurrent major depressive disorder (H)

## 2018-09-24 NOTE — PATIENT INSTRUCTIONS
I recommend you see a mental health specialist who can help with medication management   Concern is with addictive personality, poly substance abuse- possible bipolar diagnosis     Stay on Wellbutrin for now   You will be called to set up the appointment with specialist     Ok to go go back to work with shorter shifts    Let me know if you need FMLA paperwork filled out     See me back in 3 weeks for recheck   Call and let me know if any issues with getting in to see specialist

## 2018-09-25 ASSESSMENT — ANXIETY QUESTIONNAIRES: GAD7 TOTAL SCORE: 19

## 2018-09-25 ASSESSMENT — PATIENT HEALTH QUESTIONNAIRE - PHQ9: SUM OF ALL RESPONSES TO PHQ QUESTIONS 1-9: 19

## 2018-10-02 PROBLEM — F41.9 ANXIETY: Status: ACTIVE | Noted: 2018-09-24

## 2018-10-03 ENCOUNTER — TELEPHONE (OUTPATIENT)
Dept: FAMILY MEDICINE | Facility: CLINIC | Age: 33
End: 2018-10-03

## 2018-10-03 NOTE — TELEPHONE ENCOUNTER
Received an attending physician's statement report from the [facility]. Given to Rashida to complete.    [facility] [place]

## 2018-10-04 NOTE — TELEPHONE ENCOUNTER
Patient called back and scheduled an appointment for Friday 10/5/18 at 2:00 with [facility]

## 2018-10-04 NOTE — TELEPHONE ENCOUNTER
"Left message for patient to call back. Per Xavi \"Please call patient. Would recommend this either be sent to his psych provider Shade Alcaraz or he would need to see me to discuss\".     Forms on [facility]'s desk.    [facility]     "

## 2018-10-05 ENCOUNTER — OFFICE VISIT (OUTPATIENT)
Dept: FAMILY MEDICINE | Facility: CLINIC | Age: 33
End: 2018-10-05
Payer: COMMERCIAL

## 2018-10-05 VITALS
OXYGEN SATURATION: 100 % | BODY MASS INDEX: 20.27 KG/M2 | SYSTOLIC BLOOD PRESSURE: 130 MMHG | WEIGHT: 153.6 LBS | HEART RATE: 85 BPM | TEMPERATURE: 98.2 F | RESPIRATION RATE: 12 BRPM | DIASTOLIC BLOOD PRESSURE: 88 MMHG

## 2018-10-05 DIAGNOSIS — F41.9 ANXIETY: ICD-10-CM

## 2018-10-05 DIAGNOSIS — F33.1 MODERATE EPISODE OF RECURRENT MAJOR DEPRESSIVE DISORDER (H): Primary | ICD-10-CM

## 2018-10-05 DIAGNOSIS — F19.10 POLYSUBSTANCE ABUSE (H): ICD-10-CM

## 2018-10-05 PROCEDURE — 99213 OFFICE O/P EST LOW 20 MIN: CPT | Performed by: NURSE PRACTITIONER

## 2018-10-05 ASSESSMENT — PAIN SCALES - GENERAL: PAINLEVEL: NO PAIN (0)

## 2018-10-05 NOTE — LETTER
My Depression Action Plan  Name: Bradley Cruz   Date of Birth 1985  Date: 10/5/2018    My doctor: No Ref-Primary, Physician   My clinic: [address] [address]  [phone]          GREEN    ZONE   Good Control    What it looks like:     Things are going generally well. You have normal up s and down s. You may even feel depressed from time to time, but bad moods usually last less than a day.   What you need to do:  1. Continue to care for yourself (see self care plan)  2. Check your depression survival kit and update it as needed  3. Follow your physician s recommendations including any medication.  4. Do not stop taking medication unless you consult with your physician first.           YELLOW         ZONE Getting Worse    What it looks like:     Depression is starting to interfere with your life.     It may be hard to get out of bed; you may be starting to isolate yourself from others.    Symptoms of depression are starting to last most all day and this has happened for several days.     You may have suicidal thoughts but they are not constant.   What you need to do:     1. Call your care team, your response to treatment will improve if you keep your care team informed of your progress. Yellow periods are signs an adjustment may need to be made.     2. Continue your self-care, even if you have to fake it!    3. Talk to someone in your support network    4. Open up your depression survival kit           RED    ZONE Medical Alert - Get Help    What it looks like:     Depression is seriously interfering with your life.     You may experience these or other symptoms: You can t get out of bed most days, can t work or engage in other necessary activities, you have trouble taking care of basic hygiene, or basic responsibilities, thoughts of suicide or death that will not go away, self-injurious behavior.     What you need to do:  1. Call your care  team and request a same-day appointment. If they are not available (weekends or after hours) call your local crisis line, emergency room or 911.            Depression Self Care Plan / Survival Kit    Self-Care for Depression  Here s the deal. Your body and mind are really not as separate as most people think.  What you do and think affects how you feel and how you feel influences what you do and think. This means if you do things that people who feel good do, it will help you feel better.  Sometimes this is all it takes.  There is also a place for medication and therapy depending on how severe your depression is, so be sure to consult with your medical provider and/ or Behavioral Health Consultant if your symptoms are worsening or not improving.     In order to better manage my stress, I will:    Exercise  Get some form of exercise, every day. This will help reduce pain and release endorphins, the  feel good  chemicals in your brain. This is almost as good as taking antidepressants!  This is not the same as joining a gym and then never going! (they count on that by the way ) It can be as simple as just going for a walk or doing some gardening, anything that will get you moving.      Hygiene   Maintain good hygiene (Get out of bed in the morning, Make your bed, Brush your teeth, Take a shower, and Get dressed like you were going to work, even if you are unemployed).  If your clothes don't fit try to get ones that do.    Diet  I will strive to eat foods that are good for me, drink plenty of water, and avoid excessive sugar, caffeine, alcohol, and other mood-altering substances.  Some foods that are helpful in depression are: complex carbohydrates, B vitamins, flaxseed, fish or fish oil, fresh fruits and vegetables.    Psychotherapy  I agree to participate in Individual Therapy (if recommended).    Medication  If prescribed medications, I agree to take them.  Missing doses can result in serious side effects.  I  understand that drinking alcohol, or other illicit drug use, may cause potential side effects.  I will not stop my medication abruptly without first discussing it with my provider.    Staying Connected With Others  I will stay in touch with my friends, family members, and my primary care provider/team.    Use your imagination  Be creative.  We all have a creative side; it doesn t matter if it s oil painting, sand castles, or mud pies! This will also kick up the endorphins.    Witness Beauty  (AKA stop and smell the roses) Take a look outside, even in mid-winter. Notice colors, textures. Watch the squirrels and birds.     Service to others  Be of service to others.  There is always someone else in need.  By helping others we can  get out of ourselves  and remember the really important things.  This also provides opportunities for practicing all the other parts of the program.    Humor  Laugh and be silly!  Adjust your TV habits for less news and crime-drama and more comedy.    Control your stress  Try breathing deep, massage therapy, biofeedback, and meditation. Find time to relax each day.     My support system    Clinic Contact:  Phone number:    Contact 1:  Phone number:    Contact 2:  Phone number:    [facility]/:  Phone number:    Therapist:  Phone number:    Local crisis center:    Phone number:    Other community support:  Phone number:

## 2018-10-05 NOTE — PROGRESS NOTES
SUBJECTIVE:   Bradley Cruz is a 33 year old male who presents to clinic today for the following health issues:      Forms  Needs forms filled out for [employer]    Working 6-12 3 days a week   Follows up with mental health provider in [place] in 2 weeks   Was started on Zoloft   Has remained sober    PHQ-9 SCORE 8/14/2018 9/24/2018   Total Score 16 19     DERRICK-7 SCORE 8/14/2018 9/24/2018   Total Score 19 19       Problem list and histories reviewed & adjusted, as indicated.  Additional history: as documented    Patient Active Problem List   Diagnosis     Moderate episode of recurrent major depressive disorder (H)     Alcoholism (H)     Tobacco use     Anxiety     No past surgical history on file.    Social History   Substance Use Topics     Smoking status: Current Every Day Smoker     Smokeless tobacco: Never Used     Alcohol use No      Comment: no alchohol since 07/25/2018     Family History   Problem Relation Age of Onset     Thyroid Disease Mother      Substance Abuse Father            Reviewed and updated as needed this visit by clinical staff       Reviewed and updated as needed this visit by Provider         ROS:  Constitutional, HEENT, cardiovascular, pulmonary, gi and gu systems are negative, except as otherwise noted.    OBJECTIVE:                                                    /88  Pulse 85  Temp 98.2  F (36.8  C) (Tympanic)  Resp 12  Wt 153 lb 9.6 oz (69.7 kg)  SpO2 100%  BMI 20.27 kg/m2  Body mass index is 20.27 kg/(m^2).  GENERAL APPEARANCE: healthy, alert and no distress  PSYCH: anxious    Diagnostic test results:  Diagnostic Test Results:  none      ASSESSMENT/PLAN:                                                      1. Moderate episode of recurrent major depressive disorder (H)    2. Anxiety    3. Polysubstance abuse (H)      Newly established with  Shade Alcaraz NP- [facility] in [place]         Patient Instructions   Forms filled out and faxed        Katia  Gary, TRACEY  [facility]

## 2018-12-18 ENCOUNTER — TELEPHONE (OUTPATIENT)
Dept: FAMILY MEDICINE | Facility: CLINIC | Age: 33
End: 2018-12-18

## 2018-12-18 NOTE — TELEPHONE ENCOUNTER
LM to call back due for Phq-9 6 month index date.       Panel Management Review      Patient has the following on his problem list:     Depression / Dysthymia review    Measure:  Needs PHQ-9 score of 4 or less during index window.  Administer PHQ-9 and if score is 5 or more, send encounter to provider for next steps.    5 - 7 month window range: 12/14/18-4/14/19    PHQ-9 SCORE 8/14/2018 9/24/2018   PHQ-9 Total Score 16 19       If PHQ-9 recheck is 5 or more, route to provider for next steps.    Patient is due for:  PHQ9      Composite cancer screening  Chart review shows that this patient is due/due soon for the following None  Summary:    Patient is due/failing the following:   PHQ9     Action needed:   Patient needs to do PHQ9.    Type of outreach:    Phone, left message for patient to call back.  No call back, letter with Phq9 and DERRICK 7 sent.     Questions for provider review:    None                                                                                                                                    MM     Chart routed to Care Team .

## 2018-12-18 NOTE — LETTER
January 22, 2019      Bradley Cruz  [address] [place] [facility] [address]        Dear rBadley,     I am just following up on your medications.  Could you please fill out the enclosed form and return it in the enclosed envelope as soon as possible.  If you have any concerns on your medications please feel free to give us a call [phone].    Hope all is going well!      Sincerely,        Katia Vega, NP/Care Team

## 2019-07-15 NOTE — NURSING NOTE
"Chief Complaint   Patient presents with     Forms       Initial /88  Pulse 85  Temp 98.2  F (36.8  C) (Tympanic)  Resp 12  Wt 153 lb 9.6 oz (69.7 kg)  SpO2 100%  BMI 20.27 kg/m2 Estimated body mass index is 20.27 kg/(m^2) as calculated from the following:    Height as of 8/14/18: 6' 1\" (1.854 m).    Weight as of this encounter: 153 lb 9.6 oz (69.7 kg).    Patient presents to the clinic using No DME    Health Maintenance that is potentially due pending provider review:  DAP    Possibly completing today per provider review.    Is there anyone who you would like to be able to receive your results? No  If yes have patient fill out ZAIDA      "
with patient